# Patient Record
Sex: MALE | Race: WHITE | NOT HISPANIC OR LATINO | Employment: STUDENT | RURAL
[De-identification: names, ages, dates, MRNs, and addresses within clinical notes are randomized per-mention and may not be internally consistent; named-entity substitution may affect disease eponyms.]

---
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
## 2024-04-09 ENCOUNTER — OFFICE VISIT (OUTPATIENT)
Dept: PRIMARY CARE CLINIC | Facility: CLINIC | Age: 19
End: 2024-04-09

## 2024-04-09 DIAGNOSIS — Z02.4 ENCOUNTER FOR COMMERCIAL DRIVER MEDICAL EXAMINATION (CDME): Primary | ICD-10-CM

## 2024-04-09 PROCEDURE — 99499 UNLISTED E&M SERVICE: CPT | Mod: ,,, | Performed by: NURSE PRACTITIONER

## 2024-08-05 ENCOUNTER — OFFICE VISIT (OUTPATIENT)
Dept: FAMILY MEDICINE | Facility: CLINIC | Age: 19
End: 2024-08-05
Payer: COMMERCIAL

## 2024-08-05 VITALS
TEMPERATURE: 97 F | BODY MASS INDEX: 19.39 KG/M2 | HEART RATE: 84 BPM | DIASTOLIC BLOOD PRESSURE: 60 MMHG | OXYGEN SATURATION: 96 % | WEIGHT: 130.88 LBS | HEIGHT: 69 IN | SYSTOLIC BLOOD PRESSURE: 104 MMHG | RESPIRATION RATE: 18 BRPM

## 2024-08-05 DIAGNOSIS — J02.9 SORE THROAT: ICD-10-CM

## 2024-08-05 DIAGNOSIS — R50.9 FEELS FEVERISH: ICD-10-CM

## 2024-08-05 DIAGNOSIS — R09.81 NASAL CONGESTION: Primary | ICD-10-CM

## 2024-08-05 LAB
CTP QC/QA: YES
CTP QC/QA: YES
MOLECULAR STREP A: NEGATIVE
SARS-COV-2 RDRP RESP QL NAA+PROBE: NEGATIVE

## 2024-08-05 PROCEDURE — 1160F RVW MEDS BY RX/DR IN RCRD: CPT | Mod: CPTII,,, | Performed by: NURSE PRACTITIONER

## 2024-08-05 PROCEDURE — 3078F DIAST BP <80 MM HG: CPT | Mod: CPTII,,, | Performed by: NURSE PRACTITIONER

## 2024-08-05 PROCEDURE — 3074F SYST BP LT 130 MM HG: CPT | Mod: CPTII,,, | Performed by: NURSE PRACTITIONER

## 2024-08-05 PROCEDURE — 99213 OFFICE O/P EST LOW 20 MIN: CPT | Mod: ,,, | Performed by: NURSE PRACTITIONER

## 2024-08-05 PROCEDURE — 87651 STREP A DNA AMP PROBE: CPT | Mod: QW,,, | Performed by: NURSE PRACTITIONER

## 2024-08-05 PROCEDURE — 1159F MED LIST DOCD IN RCRD: CPT | Mod: CPTII,,, | Performed by: NURSE PRACTITIONER

## 2024-08-05 PROCEDURE — 87635 SARS-COV-2 COVID-19 AMP PRB: CPT | Mod: QW,,, | Performed by: NURSE PRACTITIONER

## 2024-08-05 PROCEDURE — 3008F BODY MASS INDEX DOCD: CPT | Mod: CPTII,,, | Performed by: NURSE PRACTITIONER

## 2024-08-05 RX ORDER — CETIRIZINE HYDROCHLORIDE, PSEUDOEPHEDRINE HYDROCHLORIDE 5; 120 MG/1; MG/1
1 TABLET, FILM COATED, EXTENDED RELEASE ORAL EVERY 12 HOURS PRN
Qty: 24 TABLET | Refills: 1 | Status: SHIPPED | OUTPATIENT
Start: 2024-08-05 | End: 2024-08-15

## 2024-08-09 ENCOUNTER — CLINICAL SUPPORT (OUTPATIENT)
Dept: PRIMARY CARE CLINIC | Facility: CLINIC | Age: 19
End: 2024-08-09

## 2024-08-09 VITALS
OXYGEN SATURATION: 97 % | TEMPERATURE: 98 F | HEART RATE: 66 BPM | SYSTOLIC BLOOD PRESSURE: 123 MMHG | RESPIRATION RATE: 20 BRPM | BODY MASS INDEX: 19.26 KG/M2 | HEIGHT: 69 IN | WEIGHT: 130 LBS | DIASTOLIC BLOOD PRESSURE: 75 MMHG

## 2024-08-09 DIAGNOSIS — H66.91 ACUTE RIGHT OTITIS MEDIA: Primary | ICD-10-CM

## 2024-08-09 RX ORDER — CIPROFLOXACIN AND DEXAMETHASONE 3; 1 MG/ML; MG/ML
4 SUSPENSION/ DROPS AURICULAR (OTIC) 2 TIMES DAILY
Qty: 7.5 ML | Refills: 0 | Status: SHIPPED | OUTPATIENT
Start: 2024-08-09

## 2024-08-09 RX ORDER — IBUPROFEN 800 MG/1
800 TABLET ORAL EVERY 8 HOURS PRN
Qty: 30 TABLET | Refills: 0 | Status: SHIPPED | OUTPATIENT
Start: 2024-08-09

## 2024-08-09 RX ORDER — METHYLPREDNISOLONE ACETATE 80 MG/ML
80 INJECTION, SUSPENSION INTRA-ARTICULAR; INTRALESIONAL; INTRAMUSCULAR; SOFT TISSUE
Status: COMPLETED | OUTPATIENT
Start: 2024-08-09 | End: 2024-08-09

## 2024-08-09 RX ORDER — AMOXICILLIN AND CLAVULANATE POTASSIUM 875; 125 MG/1; MG/1
1 TABLET, FILM COATED ORAL 2 TIMES DAILY
Qty: 14 TABLET | Refills: 0 | Status: SHIPPED | OUTPATIENT
Start: 2024-08-09

## 2024-08-09 RX ORDER — DEXAMETHASONE SODIUM PHOSPHATE 4 MG/ML
4 INJECTION, SOLUTION INTRA-ARTICULAR; INTRALESIONAL; INTRAMUSCULAR; INTRAVENOUS; SOFT TISSUE
Status: COMPLETED | OUTPATIENT
Start: 2024-08-09 | End: 2024-08-09

## 2024-08-09 RX ADMIN — DEXAMETHASONE SODIUM PHOSPHATE 4 MG: 4 INJECTION, SOLUTION INTRA-ARTICULAR; INTRALESIONAL; INTRAMUSCULAR; INTRAVENOUS; SOFT TISSUE at 09:08

## 2024-08-09 RX ADMIN — METHYLPREDNISOLONE ACETATE 400 MG: 80 INJECTION, SUSPENSION INTRA-ARTICULAR; INTRALESIONAL; INTRAMUSCULAR; SOFT TISSUE at 09:08

## 2025-02-07 ENCOUNTER — OFFICE VISIT (OUTPATIENT)
Dept: SURGERY | Facility: CLINIC | Age: 20
End: 2025-02-07
Attending: SURGERY
Payer: COMMERCIAL

## 2025-02-07 DIAGNOSIS — R22.42 SKIN LUMP OF LEG, LEFT: Primary | ICD-10-CM

## 2025-02-07 PROCEDURE — 99999 PR PBB SHADOW E&M-EST. PATIENT-LVL III: CPT | Mod: PBBFAC,,, | Performed by: SURGERY

## 2025-02-07 PROCEDURE — 99202 OFFICE O/P NEW SF 15 MIN: CPT | Mod: S$PBB,,, | Performed by: SURGERY

## 2025-02-07 PROCEDURE — 99213 OFFICE O/P EST LOW 20 MIN: CPT | Mod: PBBFAC | Performed by: SURGERY

## 2025-02-07 RX ORDER — SULFAMETHOXAZOLE AND TRIMETHOPRIM 800; 160 MG/1; MG/1
1 TABLET ORAL 2 TIMES DAILY
Qty: 20 TABLET | Refills: 0 | Status: SHIPPED | OUTPATIENT
Start: 2025-02-07 | End: 2025-02-10

## 2025-02-07 NOTE — PROGRESS NOTES
General Surgery History and Physical      Patient ID: Hector Gonzalez is a 19 y.o. male.    Chief Complaint: Cyst (Cyst/ lump behind left knee)      HPI:  19-year-old male who a few weeks ago noticed a little knot behind his left knee.  Says it fluctuates in size as well as pain.  He will be active and then it kind of it increases in size gets little red and tender about the size of a golf ball and then shrink back down after awhile.  He has been doing this intermittently.  It is swollen yesterday after he was climbing up and down a ladder.  No fever no chills no drainage never had this before.  He does not take antibiotics in the past but it does resolve on its own.    Review of Systems   Constitutional:  Negative for activity change, appetite change, fatigue and fever.   HENT:  Negative for trouble swallowing.    Respiratory:  Negative for cough and shortness of breath.    Cardiovascular:  Negative for chest pain and palpitations.   Gastrointestinal:  Negative for abdominal distention, abdominal pain, blood in stool, constipation and diarrhea.   Genitourinary:  Negative for flank pain.   Musculoskeletal:  Negative for neck pain and neck stiffness.   Skin:  Positive for color change.   Neurological:  Negative for weakness.       Current Outpatient Medications   Medication Sig Dispense Refill    ibuprofen (ADVIL,MOTRIN) 800 MG tablet Take 1 tablet (800 mg total) by mouth every 8 (eight) hours as needed for Pain. 30 tablet 0    amoxicillin-clavulanate 875-125mg (AUGMENTIN) 875-125 mg per tablet Take 1 tablet by mouth 2 (two) times daily. (Patient not taking: Reported on 2/7/2025) 14 tablet 0    ciprofloxacin-dexAMETHasone 0.3-0.1% (CIPRODEX) 0.3-0.1 % DrpS Place 4 drops into both ears 2 (two) times daily. (Patient not taking: Reported on 2/7/2025) 7.5 mL 0     No current facility-administered medications for this visit.        Review of patient's allergies indicates:  No Known Allergies    History reviewed. No pertinent past medical history.    History reviewed. No pertinent surgical history.    No family history on file.    Social History     Socioeconomic History    Marital status: Single   Tobacco Use    Smoking status: Never     Passive exposure: Never    Smokeless tobacco: Never   Substance and Sexual Activity    Alcohol use: Never    Drug use: Never    Sexual activity: Yes       There were no vitals filed for this visit.    Physical Exam  Constitutional:       General: He is not in acute distress.  HENT:      Head: Normocephalic.   Cardiovascular:      Rate and Rhythm: Normal rate and regular rhythm.      Pulses: Normal pulses.   Pulmonary:      Effort: Pulmonary effort is normal. No respiratory distress.      Breath sounds: Normal breath sounds.   Abdominal:      General: Abdomen is flat. There is no distension.      Palpations: Abdomen is soft.      Tenderness: There is no abdominal tenderness.   Musculoskeletal:         General: Normal range of motion.   Skin:     General: Skin is warm.      Findings: Lesion (Behind the patient's left knee has an proximally golf ball sized tender mildly red nodule.) present.   Neurological:      General: No focal deficit present.      Mental Status: He is oriented to person, place, and time.         Assessment & Plan:    Skin lump of leg, left  -     US Extremity Non Vascular Limited Left; Future; Expected date: 02/07/2025        Will ultrasound the area to make sure it is not a Baker's cyst that is fluctuating in size.  Concern also for a superinfected abscess over a skin cyst.  Will give some antibiotics and order an ultrasound to evaluate it.  He will come back afterwards and will talk about the results.  All questions answered.

## 2025-02-10 ENCOUNTER — OFFICE VISIT (OUTPATIENT)
Dept: ORTHOPEDICS | Facility: CLINIC | Age: 20
End: 2025-02-10
Payer: COMMERCIAL

## 2025-02-10 ENCOUNTER — HOSPITAL ENCOUNTER (OUTPATIENT)
Dept: RADIOLOGY | Facility: HOSPITAL | Age: 20
Discharge: HOME | End: 2025-02-10
Attending: NURSE PRACTITIONER
Payer: COMMERCIAL

## 2025-02-10 VITALS
OXYGEN SATURATION: 99 % | BODY MASS INDEX: 20.75 KG/M2 | HEART RATE: 54 BPM | DIASTOLIC BLOOD PRESSURE: 54 MMHG | SYSTOLIC BLOOD PRESSURE: 124 MMHG | WEIGHT: 140.13 LBS | HEIGHT: 69 IN

## 2025-02-10 DIAGNOSIS — L02.91 CUTANEOUS ABSCESS, UNSPECIFIED SITE: ICD-10-CM

## 2025-02-10 DIAGNOSIS — M25.562 LEFT KNEE PAIN, UNSPECIFIED CHRONICITY: ICD-10-CM

## 2025-02-10 DIAGNOSIS — M25.562 LEFT KNEE PAIN, UNSPECIFIED CHRONICITY: Primary | ICD-10-CM

## 2025-02-10 PROCEDURE — 99204 OFFICE O/P NEW MOD 45 MIN: CPT | Mod: S$PBB,,, | Performed by: NURSE PRACTITIONER

## 2025-02-10 PROCEDURE — 99213 OFFICE O/P EST LOW 20 MIN: CPT | Mod: PBBFAC,25 | Performed by: NURSE PRACTITIONER

## 2025-02-10 PROCEDURE — 1159F MED LIST DOCD IN RCRD: CPT | Mod: CPTII,,, | Performed by: NURSE PRACTITIONER

## 2025-02-10 PROCEDURE — 3074F SYST BP LT 130 MM HG: CPT | Mod: CPTII,,, | Performed by: NURSE PRACTITIONER

## 2025-02-10 PROCEDURE — 73560 X-RAY EXAM OF KNEE 1 OR 2: CPT | Mod: TC,LT

## 2025-02-10 PROCEDURE — 99999 PR PBB SHADOW E&M-EST. PATIENT-LVL III: CPT | Mod: PBBFAC,,, | Performed by: NURSE PRACTITIONER

## 2025-02-10 PROCEDURE — 3008F BODY MASS INDEX DOCD: CPT | Mod: CPTII,,, | Performed by: NURSE PRACTITIONER

## 2025-02-10 PROCEDURE — 3078F DIAST BP <80 MM HG: CPT | Mod: CPTII,,, | Performed by: NURSE PRACTITIONER

## 2025-02-10 RX ORDER — SULFAMETHOXAZOLE AND TRIMETHOPRIM 800; 160 MG/1; MG/1
1 TABLET ORAL 2 TIMES DAILY
Qty: 20 TABLET | Refills: 0 | Status: SHIPPED | OUTPATIENT
Start: 2025-02-10

## 2025-02-10 NOTE — PROGRESS NOTES
ASSESSMENT:      ICD-10-CM ICD-9-CM   1. Left knee pain, unspecified chronicity  M25.562 719.46   2. Cutaneous abscess, unspecified site  L02.91 682.9       PLAN:     Findings and treatment options were discussed with the patient regarding the diagnosis.   All questions were answered regarding Hector Gonzalez 's painful knee.      Natural history and expected course discussed. Questions answered. Educational materials distributed. Reduction in offending activity. OTC analgesics as needed.  We will schedule follow up with Dr. Premier rosado in 1 week.  Patient reports he works out of town and will be back on Friday.  We will have him return to clinic with Dr. Premier rosado on Monday.  Bactrim ds 1 p.o. b.i.d..  We did drain the abscess in the back of his knee today.  Received 3 cc bloody fluid  There are no Patient Instructions on file for this visit.    IMAGING:   No results found.   EXAMINATION:  XR KNEE, 2 VIEWS, LEFT     CLINICAL HISTORY:  Left knee pain.     TECHNIQUE:  AP, lateral views of left knee obtained.     COMPARISON:  No past imaging of left knee at this time.     Impression:     1.  No significant osseous abnormality with no fracture, dislocation, osseous destruction or joint narrowing.     2.  No significant soft tissue swelling identified.     3.  Significant clothing artifact.        Electronically signed by:Hector Collins  Date:                                            02/10/2025  Time:                                           08:28        Exam Ended: 02/10/25 08:14 CST Last Resulted: 02/10/25 08:28 CST       CC: Knee pain    19 y.o. Male who presents as a new patient to me for evaluation of  left knee pain.    Occupation:   Pain has been present for the past 3-4 months off an on.  He states that his knee will swell up and then go back down.  The swelling is in the back of the knee.  Golf ball size red swollen area that appears to be fluid-filled.  Reports it has been going on for quite some  time but it does go back down on its own.  This time it has been present for a week.  Patient reports it is very tender.  Swelling and pain posterior.   Injury description No specific injury.   Mechanical symptoms, such as clicking, locking, and popping are not present.    Swelling and effusions  are present.   The patient does not  describe symptoms of knee instability, such as the knee buckling and the knee giving way.   Symptoms are worsened with activity.  Better with rest. Treatment thus far has included rest, activity modifications, and oral medications.    he  has not had formal physical therapy.  he has not had prior injections injections into the knee.   he has not had previous advanced imaging such as MRI.     Here today to discuss diagnosis and treatment options.          REVIEW OF SYSTEMS:   Constitution: Negative. Negative for chills, fever and night sweats.    Hematologic/Lymphatic: Negative for bleeding problem. Does not bruise/bleed easily.   Skin: Negative for dry skin, itching and rash.   Musculoskeletal: Negative for falls. Positive for knee pain and muscle weakness.     All other review of symptoms were reviewed and found to be noncontributory.     PAST MEDICAL HISTORY:   History reviewed. No pertinent past medical history.    PAST SURGICAL HISTORY:   History reviewed. No pertinent surgical history.    FAMILY HISTORY:   No family history on file.    SOCIAL HISTORY:   Social History     Socioeconomic History    Marital status: Single   Tobacco Use    Smoking status: Never     Passive exposure: Never    Smokeless tobacco: Never   Substance and Sexual Activity    Alcohol use: Never    Drug use: Never    Sexual activity: Yes       MEDICATIONS:     Current Outpatient Medications:     ibuprofen (ADVIL,MOTRIN) 800 MG tablet, Take 1 tablet (800 mg total) by mouth every 8 (eight) hours as needed for Pain., Disp: 30 tablet, Rfl: 0    amoxicillin-clavulanate 875-125mg (AUGMENTIN) 875-125 mg per  tablet, Take 1 tablet by mouth 2 (two) times daily. (Patient not taking: Reported on 2/7/2025), Disp: 14 tablet, Rfl: 0    ciprofloxacin-dexAMETHasone 0.3-0.1% (CIPRODEX) 0.3-0.1 % DrpS, Place 4 drops into both ears 2 (two) times daily. (Patient not taking: Reported on 2/7/2025), Disp: 7.5 mL, Rfl: 0    sulfamethoxazole-trimethoprim 800-160mg (BACTRIM DS) 800-160 mg Tab, Take 1 tablet by mouth 2 (two) times daily., Disp: 20 tablet, Rfl: 0    ALLERGIES:   Review of patient's allergies indicates:  No Known Allergies     PHYSICAL EXAMINATION:    Ortho/SPM Exam  Abscess to the back of the left knee golf ball size.  Red warm and swollen.  No redness or swelling to the knee or knee joint.  Denies any knee pain.  All the pain is in the back of his knee.    Orders Placed This Encounter   Procedures    X-Ray Knee 1 or 2 View Left     Standing Status:   Future     Number of Occurrences:   1     Standing Expiration Date:   2/10/2026     Order Specific Question:   May the Radiologist modify the order per protocol to meet the clinical needs of the patient?     Answer:   Yes     Order Specific Question:   Release to patient     Answer:   Immediate       Large Joint Aspiration/Injection    Date/Time: 2/10/2025 8:00 AM    Performed by: Rosanna Beebe FNP  Authorized by: Rosanna Beebe FNP    Consent Done?:  Yes (Verbal)  Indications:  Pain  Prep: patient was prepped and draped in usual sterile fashion    Local anesthetic:  Bupivacaine 0.25% without epinephrine    Details:  Needle Size:  18 G  Approach:  Posterior  Aspirate amount (mL):  3  Aspirate:  Bloody

## 2025-02-17 ENCOUNTER — OFFICE VISIT (OUTPATIENT)
Dept: ORTHOPEDICS | Facility: CLINIC | Age: 20
End: 2025-02-17
Payer: COMMERCIAL

## 2025-02-17 DIAGNOSIS — L02.416 ABSCESS OF KNEE, LEFT: Primary | ICD-10-CM

## 2025-02-17 DIAGNOSIS — L02.91 CUTANEOUS ABSCESS, UNSPECIFIED SITE: Primary | ICD-10-CM

## 2025-02-17 PROCEDURE — 99213 OFFICE O/P EST LOW 20 MIN: CPT | Mod: PBBFAC | Performed by: ORTHOPAEDIC SURGERY

## 2025-02-17 NOTE — PROGRESS NOTES
CLINIC NOTE       Chief Complaint   Patient presents with    Left Knee - Follow-up        Hector Gonzalez is a 19 y.o. male seen today for evaluation of his left knee.  Developed painful swelling in the popliteal space region that has became fluctuant and erythematous and he is associated with the pain.  He was initially seen by Dr. Vicente Bowman (general surgery) who recommended orthopedic evaluation.  He was then seen by Rosanna torres FNCECIL.  The abscess was aspirated and cultured.  Cultures have grown staph epidermidis this with multiple sensitivities.  He is on Bactrim but the fluctuant areas not resolved.    X-rays left knee 02/10/2025 were reviewed AP and lateral projection.  Bones well mineralized.  Joint spaces are well maintained with no evidence of fracture, dislocation or pathologic bone.  History reviewed. No pertinent past medical history.  No family history on file.  Medications Ordered Prior to Encounter[1]    ROS     There were no vitals filed for this visit.    History reviewed. No pertinent surgical history.     Review of patient's allergies indicates:  No Known Allergies     Ortho Exam :  Well-developed well-nourished  male no acute distress.  Alert oriented cooperative.  Neck is supple without JVD.  Breathing is regular nonlabored.  Skin is warm and dry no lesions seen.  Left knee show 2.5 cm ovoid erythematous area in the popliteal space is region.  There is tenderness palpation in increased heat.  There is no swelling of the knee.    Radiographic Examination:    Technique:    Findings:    Impression:   See Above    Assessment and Plan  There are no active problems to display for this patient.   Impression: Subcutaneous abscess-left knee popliteal space   Plan:  The patient is offered I and D left knee abscess as an outpatient.  Potential benefits risks surgery outlined to include but not limited to bleeding, infection, damage to blood vessels and nerves, need for further surgery, other  risks and complications including even death the patient wished to proceed.      Roberto Escobar M.D.                   [1]   Current Outpatient Medications on File Prior to Visit   Medication Sig Dispense Refill    amoxicillin-clavulanate 875-125mg (AUGMENTIN) 875-125 mg per tablet Take 1 tablet by mouth 2 (two) times daily. (Patient not taking: Reported on 2/7/2025) 14 tablet 0    ciprofloxacin-dexAMETHasone 0.3-0.1% (CIPRODEX) 0.3-0.1 % DrpS Place 4 drops into both ears 2 (two) times daily. (Patient not taking: Reported on 2/7/2025) 7.5 mL 0    ibuprofen (ADVIL,MOTRIN) 800 MG tablet Take 1 tablet (800 mg total) by mouth every 8 (eight) hours as needed for Pain. 30 tablet 0    sulfamethoxazole-trimethoprim 800-160mg (BACTRIM DS) 800-160 mg Tab Take 1 tablet by mouth 2 (two) times daily. 20 tablet 0     No current facility-administered medications on file prior to visit.

## 2025-02-20 NOTE — H&P
[facility] - Orthopedic Periop Services  Orthopedics  H&P    Patient Name: Hector Gonzalez  MRN: [id number]  Admission Date: (Not on file)  Primary Care Provider: Therese Myles NP    Patient information was obtained from patient and ER records.     Subjective:     Principal Problem:Abscess of knee, left    Chief Complaint: No chief complaint on file.       HPI: Chief complaint:  Painful abscess left knee  History:   Hector Gonzalez is a 19 y.o. male seen  for evaluation of his left knee.  Developed painful swelling in the popliteal space region that has became fluctuant and erythematous and he is associated with the pain.  He was initially seen by Dr. Vicente Bowman (general surgery) who recommended orthopedic evaluation.  He was then seen by Rosanna JO.  The abscess was aspirated and cultured.  Cultures have grown staph epidermidis this with multiple sensitivities.  He is on Bactrim but the fluctuant areas not resolved.    X-rays left knee 02/10/2025 were reviewed AP and lateral projection.  Bones well mineralized.  Joint spaces are well maintained with no evidence of fracture, dislocation or pathologic bone.        History reviewed. No pertinent past medical history.    History reviewed. No pertinent surgical history.    Review of patient's allergies indicates:  No Known Allergies    No current facility-administered medications for this encounter.     Current Outpatient Medications   Medication Sig    amoxicillin-clavulanate 875-125mg (AUGMENTIN) 875-125 mg per tablet Take 1 tablet by mouth 2 (two) times daily. (Patient not taking: Reported on 2/7/2025)    ciprofloxacin-dexAMETHasone 0.3-0.1% (CIPRODEX) 0.3-0.1 % DrpS Place 4 drops into both ears 2 (two) times daily. (Patient not taking: Reported on 2/7/2025)    ibuprofen (ADVIL,MOTRIN) 800 MG tablet Take 1 tablet (800 mg total) by mouth every 8 (eight) hours as needed for Pain.    sulfamethoxazole-trimethoprim 800-160mg (BACTRIM DS) 800-160 mg Tab Take 1  tablet by mouth 2 (two) times daily.     Family History    None       Tobacco Use    Smoking status: Never     Passive exposure: Never    Smokeless tobacco: Never   Substance and Sexual Activity    Alcohol use: Never    Drug use: Never    Sexual activity: Yes     Review of Systems   Constitutional: Negative.     Objective:     Vital Signs (Most Recent):    Vital Signs (24h Range):  BP: ()/()   Arterial Line BP: ()/()            There is no height or weight on file to calculate BMI.    No intake or output data in the 24 hours ending 02/20/25 1704     General    Vitals reviewed.  Constitutional: He is oriented to person, place, and time. He appears well-developed and well-nourished.   HENT:   Head: Normocephalic and atraumatic.   Eyes: EOM are normal. Pupils are equal, round, and reactive to light.   Neck: Neck supple.   Cardiovascular:  Normal rate, regular rhythm and normal heart sounds.            Pulmonary/Chest: Effort normal and breath sounds normal.   Abdominal: Soft. Bowel sounds are normal.   Neurological: He is alert and oriented to person, place, and time.   Psychiatric: He has a normal mood and affect. His behavior is normal.           Right Knee Exam   Right knee exam is normal.    Left Knee Exam     Inspection   Erythema: present  Swelling: present    Range of Motion   Extension:  normal   Flexion:  normal     Tests   Stability   Lachman: normal (-1 to 2mm)   PCL-Posterior Drawer: normal (0 to 2mm)  MCL - Valgus: normal (0 to 2mm)  LCL - Varus: normal (0 to 2mm)  Pivot Shift: normal (Equal)    Other   Sensation: normal    Vascular Exam       Left Pulses  Dorsalis Pedis:      2+  Posterior Tibial:      2+           Significant Labs: All pertinent labs within the past 24 hours have been reviewed.    Significant Imaging: I have reviewed all pertinent imaging results/findings.  Assessment/Plan:     No notes have been filed under this hospital service.  Service: Orthopedic Surgery      Roberto Escobar  MD  Orthopedics  [facility] - Orthopedic Periop Services

## 2025-02-20 NOTE — HPI
Chief complaint:  Painful abscess left knee  History:   Hector Gonzalez is a 19 y.o. male seen  for evaluation of his left knee.  Developed painful swelling in the popliteal space region that has became fluctuant and erythematous and he is associated with the pain.  He was initially seen by Dr. Vicente Bowman (general surgery) who recommended orthopedic evaluation.  He was then seen by Rosanna JO.  The abscess was aspirated and cultured.  Cultures have grown staph epidermidis this with multiple sensitivities.  He is on Bactrim but the fluctuant areas not resolved.    X-rays left knee 02/10/2025 were reviewed AP and lateral projection.  Bones well mineralized.  Joint spaces are well maintained with no evidence of fracture, dislocation or pathologic bone.

## 2025-02-20 NOTE — SUBJECTIVE & OBJECTIVE
History reviewed. No pertinent past medical history.    History reviewed. No pertinent surgical history.    Review of patient's allergies indicates:  No Known Allergies    No current facility-administered medications for this encounter.     Current Outpatient Medications   Medication Sig    amoxicillin-clavulanate 875-125mg (AUGMENTIN) 875-125 mg per tablet Take 1 tablet by mouth 2 (two) times daily. (Patient not taking: Reported on 2/7/2025)    ciprofloxacin-dexAMETHasone 0.3-0.1% (CIPRODEX) 0.3-0.1 % DrpS Place 4 drops into both ears 2 (two) times daily. (Patient not taking: Reported on 2/7/2025)    ibuprofen (ADVIL,MOTRIN) 800 MG tablet Take 1 tablet (800 mg total) by mouth every 8 (eight) hours as needed for Pain.    sulfamethoxazole-trimethoprim 800-160mg (BACTRIM DS) 800-160 mg Tab Take 1 tablet by mouth 2 (two) times daily.     Family History    None       Tobacco Use    Smoking status: Never     Passive exposure: Never    Smokeless tobacco: Never   Substance and Sexual Activity    Alcohol use: Never    Drug use: Never    Sexual activity: Yes     Review of Systems   Constitutional: Negative.     Objective:     Vital Signs (Most Recent):    Vital Signs (24h Range):  BP: ()/()   Arterial Line BP: ()/()            There is no height or weight on file to calculate BMI.    No intake or output data in the 24 hours ending 02/20/25 1704     General    Vitals reviewed.  Constitutional: He is oriented to person, place, and time. He appears well-developed and well-nourished.   HENT:   Head: Normocephalic and atraumatic.   Eyes: EOM are normal. Pupils are equal, round, and reactive to light.   Neck: Neck supple.   Cardiovascular:  Normal rate, regular rhythm and normal heart sounds.            Pulmonary/Chest: Effort normal and breath sounds normal.   Abdominal: Soft. Bowel sounds are normal.   Neurological: He is alert and oriented to person, place, and time.   Psychiatric: He has a normal mood and affect. His behavior  is normal.           Right Knee Exam   Right knee exam is normal.    Left Knee Exam     Inspection   Erythema: present  Swelling: present    Range of Motion   Extension:  normal   Flexion:  normal     Tests   Stability   Lachman: normal (-1 to 2mm)   PCL-Posterior Drawer: normal (0 to 2mm)  MCL - Valgus: normal (0 to 2mm)  LCL - Varus: normal (0 to 2mm)  Pivot Shift: normal (Equal)    Other   Sensation: normal    Vascular Exam       Left Pulses  Dorsalis Pedis:      2+  Posterior Tibial:      2+           Significant Labs: All pertinent labs within the past 24 hours have been reviewed.    Significant Imaging: I have reviewed all pertinent imaging results/findings.

## 2025-02-21 ENCOUNTER — ANESTHESIA (OUTPATIENT)
Dept: SURGERY | Facility: HOSPITAL | Age: 20
End: 2025-02-21
Payer: COMMERCIAL

## 2025-02-21 ENCOUNTER — ANESTHESIA EVENT (OUTPATIENT)
Dept: SURGERY | Facility: HOSPITAL | Age: 20
End: 2025-02-21
Payer: COMMERCIAL

## 2025-02-21 ENCOUNTER — HOSPITAL ENCOUNTER (OUTPATIENT)
Facility: HOSPITAL | Age: 20
Discharge: HOME | End: 2025-02-21
Attending: ORTHOPAEDIC SURGERY | Admitting: ORTHOPAEDIC SURGERY
Payer: COMMERCIAL

## 2025-02-21 VITALS
HEART RATE: 48 BPM | OXYGEN SATURATION: 98 % | DIASTOLIC BLOOD PRESSURE: 41 MMHG | TEMPERATURE: 98 F | SYSTOLIC BLOOD PRESSURE: 102 MMHG | HEIGHT: 69 IN | BODY MASS INDEX: 20.73 KG/M2 | RESPIRATION RATE: 16 BRPM | WEIGHT: 140 LBS

## 2025-02-21 DIAGNOSIS — L02.91 CUTANEOUS ABSCESS, UNSPECIFIED SITE: ICD-10-CM

## 2025-02-21 DIAGNOSIS — L02.416 ABSCESS OF KNEE, LEFT: Primary | ICD-10-CM

## 2025-02-21 PROCEDURE — 63600175 PHARM REV CODE 636 W HCPCS: Performed by: NURSE ANESTHETIST, CERTIFIED REGISTERED

## 2025-02-21 PROCEDURE — 88304 TISSUE EXAM BY PATHOLOGIST: CPT | Mod: 26,,, | Performed by: PATHOLOGY

## 2025-02-21 PROCEDURE — 27000165 HC TUBE, ETT CUFFED: Performed by: ANESTHESIOLOGY

## 2025-02-21 PROCEDURE — 27000716 HC OXISENSOR PROBE, ANY SIZE: Performed by: ANESTHESIOLOGY

## 2025-02-21 PROCEDURE — 87070 CULTURE OTHR SPECIMN AEROBIC: CPT | Performed by: ORTHOPAEDIC SURGERY

## 2025-02-21 PROCEDURE — 71000033 HC RECOVERY, INTIAL HOUR: Performed by: ORTHOPAEDIC SURGERY

## 2025-02-21 PROCEDURE — 36000705 HC OR TIME LEV I EA ADD 15 MIN: Performed by: ORTHOPAEDIC SURGERY

## 2025-02-21 PROCEDURE — 27000655: Performed by: ANESTHESIOLOGY

## 2025-02-21 PROCEDURE — 25000003 PHARM REV CODE 250: Performed by: ORTHOPAEDIC SURGERY

## 2025-02-21 PROCEDURE — 27301 DRAIN THIGH/KNEE LESION: CPT | Mod: LT,,, | Performed by: ORTHOPAEDIC SURGERY

## 2025-02-21 PROCEDURE — 37000009 HC ANESTHESIA EA ADD 15 MINS: Performed by: ORTHOPAEDIC SURGERY

## 2025-02-21 PROCEDURE — D9220A PRA ANESTHESIA: Mod: CRNA,,, | Performed by: NURSE ANESTHETIST, CERTIFIED REGISTERED

## 2025-02-21 PROCEDURE — 25000003 PHARM REV CODE 250: Performed by: NURSE ANESTHETIST, CERTIFIED REGISTERED

## 2025-02-21 PROCEDURE — 97161 PT EVAL LOW COMPLEX 20 MIN: CPT

## 2025-02-21 PROCEDURE — 87075 CULTR BACTERIA EXCEPT BLOOD: CPT | Performed by: ORTHOPAEDIC SURGERY

## 2025-02-21 PROCEDURE — 88304 TISSUE EXAM BY PATHOLOGIST: CPT | Mod: TC,SUR | Performed by: ORTHOPAEDIC SURGERY

## 2025-02-21 PROCEDURE — 37000008 HC ANESTHESIA 1ST 15 MINUTES: Performed by: ORTHOPAEDIC SURGERY

## 2025-02-21 PROCEDURE — D9220A PRA ANESTHESIA: Mod: ANES,,, | Performed by: ANESTHESIOLOGY

## 2025-02-21 PROCEDURE — 63600175 PHARM REV CODE 636 W HCPCS: Performed by: ANESTHESIOLOGY

## 2025-02-21 PROCEDURE — 36000704 HC OR TIME LEV I 1ST 15 MIN: Performed by: ORTHOPAEDIC SURGERY

## 2025-02-21 PROCEDURE — 71000015 HC POSTOP RECOV 1ST HR: Performed by: ORTHOPAEDIC SURGERY

## 2025-02-21 RX ORDER — ONDANSETRON HYDROCHLORIDE 2 MG/ML
4 INJECTION, SOLUTION INTRAVENOUS DAILY PRN
Status: DISCONTINUED | OUTPATIENT
Start: 2025-02-21 | End: 2025-02-21 | Stop reason: HOSPADM

## 2025-02-21 RX ORDER — HYDROCODONE BITARTRATE AND ACETAMINOPHEN 10; 325 MG/1; MG/1
1 TABLET ORAL EVERY 4 HOURS PRN
Status: DISCONTINUED | OUTPATIENT
Start: 2025-02-21 | End: 2025-02-21 | Stop reason: HOSPADM

## 2025-02-21 RX ORDER — ACETAMINOPHEN 500 MG
1000 TABLET ORAL EVERY 6 HOURS PRN
Status: DISCONTINUED | OUTPATIENT
Start: 2025-02-21 | End: 2025-02-21 | Stop reason: HOSPADM

## 2025-02-21 RX ORDER — MORPHINE SULFATE 10 MG/ML
4 INJECTION INTRAMUSCULAR; INTRAVENOUS; SUBCUTANEOUS EVERY 5 MIN PRN
Status: DISCONTINUED | OUTPATIENT
Start: 2025-02-21 | End: 2025-02-21 | Stop reason: HOSPADM

## 2025-02-21 RX ORDER — HYDROCODONE BITARTRATE AND ACETAMINOPHEN 5; 325 MG/1; MG/1
1 TABLET ORAL EVERY 6 HOURS PRN
Qty: 28 TABLET | Refills: 0 | Status: SHIPPED | OUTPATIENT
Start: 2025-02-21 | End: 2025-02-28

## 2025-02-21 RX ORDER — LIDOCAINE HYDROCHLORIDE 20 MG/ML
INJECTION, SOLUTION EPIDURAL; INFILTRATION; INTRACAUDAL; PERINEURAL
Status: DISCONTINUED | OUTPATIENT
Start: 2025-02-21 | End: 2025-02-21

## 2025-02-21 RX ORDER — PROMETHAZINE HYDROCHLORIDE 25 MG/1
25 TABLET ORAL EVERY 6 HOURS PRN
Status: DISCONTINUED | OUTPATIENT
Start: 2025-02-21 | End: 2025-02-21 | Stop reason: HOSPADM

## 2025-02-21 RX ORDER — ROCURONIUM BROMIDE 10 MG/ML
INJECTION, SOLUTION INTRAVENOUS
Status: DISCONTINUED | OUTPATIENT
Start: 2025-02-21 | End: 2025-02-21

## 2025-02-21 RX ORDER — HYDROCODONE BITARTRATE AND ACETAMINOPHEN 5; 325 MG/1; MG/1
1 TABLET ORAL EVERY 4 HOURS PRN
Status: DISCONTINUED | OUTPATIENT
Start: 2025-02-21 | End: 2025-02-21 | Stop reason: HOSPADM

## 2025-02-21 RX ORDER — HYDROMORPHONE HYDROCHLORIDE 2 MG/ML
0.5 INJECTION, SOLUTION INTRAMUSCULAR; INTRAVENOUS; SUBCUTANEOUS EVERY 5 MIN PRN
Status: DISCONTINUED | OUTPATIENT
Start: 2025-02-21 | End: 2025-02-21 | Stop reason: HOSPADM

## 2025-02-21 RX ORDER — SODIUM CHLORIDE 9 MG/ML
INJECTION, SOLUTION INTRAVENOUS CONTINUOUS
Status: DISCONTINUED | OUTPATIENT
Start: 2025-02-21 | End: 2025-02-21 | Stop reason: HOSPADM

## 2025-02-21 RX ORDER — MEPERIDINE HYDROCHLORIDE 25 MG/ML
25 INJECTION INTRAMUSCULAR; INTRAVENOUS; SUBCUTANEOUS EVERY 10 MIN PRN
Status: DISCONTINUED | OUTPATIENT
Start: 2025-02-21 | End: 2025-02-21 | Stop reason: HOSPADM

## 2025-02-21 RX ORDER — GLUCAGON 1 MG
1 KIT INJECTION
Status: DISCONTINUED | OUTPATIENT
Start: 2025-02-21 | End: 2025-02-21 | Stop reason: HOSPADM

## 2025-02-21 RX ORDER — FENTANYL CITRATE 50 UG/ML
INJECTION, SOLUTION INTRAMUSCULAR; INTRAVENOUS
Status: DISCONTINUED | OUTPATIENT
Start: 2025-02-21 | End: 2025-02-21

## 2025-02-21 RX ORDER — ONDANSETRON 4 MG/1
8 TABLET, ORALLY DISINTEGRATING ORAL EVERY 8 HOURS PRN
Status: DISCONTINUED | OUTPATIENT
Start: 2025-02-21 | End: 2025-02-21 | Stop reason: HOSPADM

## 2025-02-21 RX ORDER — PROPOFOL 10 MG/ML
VIAL (ML) INTRAVENOUS
Status: DISCONTINUED | OUTPATIENT
Start: 2025-02-21 | End: 2025-02-21

## 2025-02-21 RX ORDER — ONDANSETRON HYDROCHLORIDE 2 MG/ML
INJECTION, SOLUTION INTRAVENOUS
Status: DISCONTINUED | OUTPATIENT
Start: 2025-02-21 | End: 2025-02-21

## 2025-02-21 RX ORDER — MIDAZOLAM HYDROCHLORIDE 1 MG/ML
INJECTION INTRAMUSCULAR; INTRAVENOUS
Status: DISCONTINUED | OUTPATIENT
Start: 2025-02-21 | End: 2025-02-21

## 2025-02-21 RX ORDER — KETOROLAC TROMETHAMINE 30 MG/ML
INJECTION, SOLUTION INTRAMUSCULAR; INTRAVENOUS
Status: DISCONTINUED | OUTPATIENT
Start: 2025-02-21 | End: 2025-02-21

## 2025-02-21 RX ORDER — DEXAMETHASONE SODIUM PHOSPHATE 4 MG/ML
INJECTION, SOLUTION INTRA-ARTICULAR; INTRALESIONAL; INTRAMUSCULAR; INTRAVENOUS; SOFT TISSUE
Status: DISCONTINUED | OUTPATIENT
Start: 2025-02-21 | End: 2025-02-21

## 2025-02-21 RX ORDER — DIPHENHYDRAMINE HYDROCHLORIDE 50 MG/ML
25 INJECTION INTRAMUSCULAR; INTRAVENOUS EVERY 6 HOURS PRN
Status: DISCONTINUED | OUTPATIENT
Start: 2025-02-21 | End: 2025-02-21 | Stop reason: HOSPADM

## 2025-02-21 RX ORDER — CEFAZOLIN 2 G/1
2 INJECTION, POWDER, FOR SOLUTION INTRAMUSCULAR; INTRAVENOUS
Status: DISCONTINUED | OUTPATIENT
Start: 2025-02-21 | End: 2025-02-21 | Stop reason: HOSPADM

## 2025-02-21 RX ADMIN — LIDOCAINE HYDROCHLORIDE 100 MG: 20 INJECTION, SOLUTION INTRAVENOUS at 08:02

## 2025-02-21 RX ADMIN — ROCURONIUM BROMIDE 50 MG: 10 INJECTION, SOLUTION INTRAVENOUS at 08:02

## 2025-02-21 RX ADMIN — DEXAMETHASONE SODIUM PHOSPHATE 8 MG: 4 INJECTION, SOLUTION INTRA-ARTICULAR; INTRALESIONAL; INTRAMUSCULAR; INTRAVENOUS; SOFT TISSUE at 08:02

## 2025-02-21 RX ADMIN — SODIUM CHLORIDE: 9 INJECTION, SOLUTION INTRAVENOUS at 07:02

## 2025-02-21 RX ADMIN — KETOROLAC TROMETHAMINE 30 MG: 30 INJECTION, SOLUTION INTRAMUSCULAR at 08:02

## 2025-02-21 RX ADMIN — MIDAZOLAM HYDROCHLORIDE 2 MG: 1 INJECTION, SOLUTION INTRAMUSCULAR; INTRAVENOUS at 08:02

## 2025-02-21 RX ADMIN — PROPOFOL 150 MG: 10 INJECTION, EMULSION INTRAVENOUS at 08:02

## 2025-02-21 RX ADMIN — FENTANYL CITRATE 100 MCG: 50 INJECTION, SOLUTION INTRAMUSCULAR; INTRAVENOUS at 08:02

## 2025-02-21 RX ADMIN — SUGAMMADEX 200 MG: 100 INJECTION, SOLUTION INTRAVENOUS at 09:02

## 2025-02-21 RX ADMIN — MEPERIDINE HYDROCHLORIDE 12.5 MG: 25 INJECTION INTRAMUSCULAR; INTRAVENOUS; SUBCUTANEOUS at 09:02

## 2025-02-21 RX ADMIN — DEXTROSE MONOHYDRATE 2 G: 50 INJECTION, SOLUTION INTRAVENOUS at 08:02

## 2025-02-21 RX ADMIN — ONDANSETRON 4 MG: 2 INJECTION INTRAMUSCULAR; INTRAVENOUS at 08:02

## 2025-02-21 NOTE — INTERVAL H&P NOTE
The patient has been examined and the H&P has been reviewed:    I concur with the findings and no changes have occurred since H&P was written.    Surgery risks, benefits and alternative options discussed and understood by patient/family.          Active Hospital Problems    Diagnosis  POA    *Abscess of knee, left [L02.416]  Yes      Resolved Hospital Problems   No resolved problems to display.

## 2025-02-21 NOTE — PT/OT/SLP EVAL
Physical Therapy Evaluation and Discharge Note    Patient Name:  Hector Gonzalez   MRN:  [id number]    Recommendations:     Discharge Recommendations: No Therapy Indicated  Discharge Equipment Recommendations: none   Barriers to discharge: None    Assessment:     Hector Gonzalez is a 19 y.o. male admitted with a medical diagnosis of Abscess of knee, left. Pt had drainage of abscess in LLE, no post op pain, ambulatory without AD.  At this time, patient is functioning at their prior level of function and does not require further acute PT services.     Recent Surgery: Procedure(s) (LRB):  INCISION AND DRAINAGE, KNEE (Left) * Day of Surgery *    Plan:     During this hospitalization, patient does not require further acute PT services.  Please re-consult if situation changes.      Subjective     Chief Complaint: left knee abscess   Patient/Family Comments/goals: Pt is agreeable to PT   Pain/Comfort:  Pain Rating 1: 0/10  Pain Rating Post-Intervention 1: 0/10    Patients cultural, spiritual, Yarsani conflicts given the current situation: no    Living Environment:  Pt lives at home with family  Prior to admission, patients level of function was independent.  Equipment used at home: none.  DME owned (not currently used): none.  Upon discharge, patient will have assistance from family.    Objective:     Communicated with Rn prior to session.  Patient found HOB elevated with peripheral IV upon PT entry to room.    General Precautions: Standard, fall    Orthopedic Precautions:LLE weight bearing as tolerated   Braces: N/A  Respiratory Status: Room air    Exams:  Cognitive Exam:  Patient is oriented to Person, Place, Time, and Situation  Gross Motor Coordination:  WFL    Functional Mobility:  Bed Mobility:     Scooting: independence  Supine to Sit: independence  Transfers:     Sit to Stand:  independence with no AD  Gait: 50 ft independent  Balance: good    AM-PAC 6 CLICK MOBILITY  Total Score:24       Treatment and  Education:  N/a    AM-PAC 6 CLICK MOBILITY  Total Score:24     Patient left ambulatory in room/ortega with all lines intact and call button in reach.    GOALS:   Multidisciplinary Problems       Physical Therapy Goals       Not on file                    DME Justifications:  No DME recommended requiring DME justifications    History:     History reviewed. No pertinent past medical history.    History reviewed. No pertinent surgical history.    Time Tracking:     PT Received On: 02/21/25  PT Start Time: 1055     PT Stop Time: 1103  PT Total Time (min): 8 min     Billable Minutes: Evaluation low complexity      02/21/2025

## 2025-02-21 NOTE — OP NOTE
[facility] - Orthopedic Periop Services  Surgery Department  Operative Note    SUMMARY     Date of Procedure: 2/21/2025     Procedure: Procedure(s) (LRB):  INCISION AND DRAINAGE, KNEE (Left)     Surgeons and Role:     * Roberto Escobar MD - Primary    Assisting Surgeon: None    Pre-Operative Diagnosis: Cutaneous abscess, unspecified site [L02.91]    Post-Operative Diagnosis: Post-Op Diagnosis Codes:     * Cutaneous abscess, unspecified site [L02.91]    Anesthesia:  General    Technical Procedures Used:  The patient is taken the operating room placed supine position.  After adequate level of general anesthesia been achieved (see anesthesia note) the patient is transferred to the right lateral decubitus position and.  The left lower extremity prepped with Betadine draped sterile fashion.  A pneumatic tourniquet placed about the left upper thigh was inflated to pressure of 300 mmHg.  Inspection of the left knee revealed a fluctuant soft tissue swelling involving the popliteal space region.  Skin incision was made in line with the skin creases transversely.  Incision was carried carefully through subcutaneous layers.  Purulent material and a walled-off cavity was identified.  Aerobic and anaerobic cultures were obtained.  Cavity was irrigated antibiotic solution.  The fibers appearing walls of the abscess cavity were excised and submitted for pathologic evaluation.  No foreign bodies were identified.  Wound was again irrigated with antibiotic solution.  Tourniquet was let down.  Hemostasis was achieved with the Bovie electrocautery.  Skin margins were approximated with 4-0 nylon suture using a 0 tension relieving technique.  Wounds dressed sterilely and the patient was awakened taken recovery room in good condition.  Tourniquet time 15 minutes.  The patient received Ancef antibiotic intravenously prior to procedure    Description of the Findings of the Procedure:  Subcutaneous abscess cavity popliteal space  left knee    Significant Surgical Tasks Conducted by the Assistant(s), if Applicable:  None    Complications: No    Estimated Blood Loss (EBL): * No values recorded between 2/21/2025  8:52 AM and 2/21/2025  9:21 AM *           Implants: * No implants in log *    Specimens:   Specimen (24h ago, onward)       Start     Ordered    02/21/25 0921  Surgical Pathology  Once        Question Answer Comment   Number of specimens 1    Source(s): Knee, Left    Clinical History: left knee abscess        02/21/25 0921    02/21/25 0859  Surgical Pathology  RELEASE UPON ORDERING         02/21/25 0859                            Condition: Good    Disposition: PACU - hemodynamically stable.    Attestation: I was present and scrubbed for the entire procedure.

## 2025-02-21 NOTE — TRANSFER OF CARE
"Anesthesia Transfer of Care Note    Patient: Hector Gonzalez    Procedure(s) Performed: Procedure(s) (LRB):  INCISION AND DRAINAGE, KNEE (Left)    Patient location: PACU    Anesthesia Type: general    Transport from OR: Transported from OR on 6-10 L/min O2 by face mask with adequate spontaneous ventilation    Post pain: adequate analgesia    Post assessment: no apparent anesthetic complications    Post vital signs: stable    Level of consciousness: awake and alert    Nausea/Vomiting: no nausea/vomiting    Complications: none    Transfer of care protocol was followed      Last vitals: Visit Vitals  /63   Pulse 88   Temp 36.6 °C (97.9 °F)   Resp 16   Ht 5' 9" (1.753 m)   Wt 63.5 kg (140 lb)   SpO2 99%   BMI 20.67 kg/m²     "

## 2025-02-21 NOTE — OP NOTE
[facility] - Orthopedic Periop Services  Surgery Department  Operative Note    SUMMARY     Date of Procedure: 2/21/2025     Procedure: Procedure(s) (LRB):  INCISION AND DRAINAGE, KNEE (Left)     Surgeons and Role:     * Roberto Escobar MD - Primary    Assisting Surgeon: None    Pre-Operative Diagnosis: Cutaneous abscess, unspecified site [L02.91]    Post-Operative Diagnosis: Post-Op Diagnosis Codes:     * Cutaneous abscess, unspecified site [L02.91]    Anesthesia:  General    Operative Findings (including complications, if any):  Abscess popliteal space left knee    Description of Technical Procedures:  Incision and drainage abscess popliteal space left knee    Significant Surgical Tasks Conducted by the Assistant(s), if Applicable:  None    Estimated Blood Loss (EBL): * No values recorded between 2/21/2025  8:52 AM and 2/21/2025  9:21 AM * 5 cc           Implants: * No implants in log *    Specimens:   Specimen (24h ago, onward)       Start     Ordered    02/21/25 0921  Surgical Pathology  Once        Question Answer Comment   Number of specimens 1    Source(s): Knee, Left    Clinical History: left knee abscess        02/21/25 0921    02/21/25 0859  Surgical Pathology  RELEASE UPON ORDERING         02/21/25 0859                            Condition: Good    Disposition: PACU - hemodynamically stable.    Attestation: Op Note Attestation: I was physically present and scrubbed for the entire procedure.

## 2025-02-21 NOTE — ANESTHESIA PROCEDURE NOTES
Intubation    Date/Time: 2/21/2025 8:29 AM    Performed by: Maykel Sparrow CRNA  Authorized by: Guillermo Gibbs MD    Intubation:     Induction:  Intravenous    Intubated:  Postinduction    Mask Ventilation:  Easy mask    Attempts:  1    Attempted By:  CRNA    Method of Intubation:  Direct    Blade:  Vivas 2    Laryngeal View Grade: Grade IIA - cords partially seen      Difficult Airway Encountered?: No      Complications:  None    Airway Device:  Oral endotracheal tube    Airway Device Size:  7.5    Style/Cuff Inflation:  Cuffed    Inflation Amount (mL):  10    Tube secured:  22    Secured at:  The lips    Placement Verified By:  Capnometry    Complicating Factors:  None    Findings Post-Intubation:  BS equal bilateral

## 2025-02-21 NOTE — DISCHARGE SUMMARY
"[facility] - Orthopedic Periop Services  Discharge Note  Short Stay    Procedure(s) (LRB):  INCISION AND DRAINAGE, KNEE (Left)      OUTCOME: Patient tolerated treatment/procedure well without complication and is now ready for discharge.    DISPOSITION: Home or Self Care    FINAL DIAGNOSIS:  Abscess of knee, left    FOLLOWUP: In clinic    DISCHARGE INSTRUCTIONS:    Discharge Procedure Orders   CRUTCHES FOR HOME USE     Order Specific Question Answer Comments   Type: Axillary    Height: 5' 9" (1.753 m)    Weight: 63.5 kg (140 lb)    Length of need (1-99 months): 2      Diet general     Keep surgical extremity elevated     Ice to affected area   Order Comments: using barrier between ice and skin (specify duration&frequency)     Remove dressing in 72 hours   Order Comments: Keep dressing in place for 72 hours     Change dressing (specify)   Order Comments: Dressing change: one time per day beginning 72 hours post op.     Call MD for:  temperature >100.4     Call MD for:  persistent nausea and vomiting     Call MD for:  severe uncontrolled pain     Call MD for:  difficulty breathing, headache or visual disturbances     Call MD for:  redness, tenderness, or signs of infection (pain, swelling, redness, odor or green/yellow discharge around incision site)     Call MD for:  hives     Call MD for:  persistent dizziness or light-headedness     Call MD for:  extreme fatigue     Activity as tolerated     Shower on day dressing removed (No bath)     Weight bearing as tolerated        TIME SPENT ON DISCHARGE:  15 minutes  "

## 2025-02-21 NOTE — OR NURSING
0922 Rec'd pt to PACU asleep with no distress noted, respirations even and unlabored. Temp 94.5 oral, all other VSS. Getachew hugger and warm blankets applied. Left knee dressing C/D/I. No needs. Will continue to monitor.     1011 Out of PACU. VSS. No signs of bleeding/distress noted. Recheck temp  oral.     1015 Pt to ASC 21 awake and alert with no distress noted, respirations even and unlabored. Family at bedside. Bedside report given to ABDELRAHMAN Brandon RN. Left knee dressing C/D/I. Denies pain/needs. /52, P 54, R 16, O2 98% RA.

## 2025-02-23 LAB — MICROORGANISM SPEC CULT: NORMAL

## 2025-02-24 LAB
ESTROGEN SERPL-MCNC: NORMAL PG/ML
INSULIN SERPL-ACNC: NORMAL U[IU]/ML
LAB AP CLINICAL INFORMATION: NORMAL
LAB AP GROSS DESCRIPTION: NORMAL
LAB AP LABORATORY NOTES: NORMAL
T3RU NFR SERPL: NORMAL %

## 2025-02-25 NOTE — ANESTHESIA POSTPROCEDURE EVALUATION
Anesthesia Post Evaluation    Patient: Hector Gonzalez    Procedure(s) Performed: Procedure(s) (LRB):  INCISION AND DRAINAGE, KNEE (Left)    Final Anesthesia Type: general      Patient location during evaluation: PACU  Post-procedure vital signs: reviewed and stable  Pain management: adequate  Airway patency: patent    PONV status at discharge: No PONV  Anesthetic complications: no      Cardiovascular status: hemodynamically stable  Respiratory status: unassisted  Hydration status: euvolemic  Follow-up not needed.              Vitals Value Taken Time   /41 02/21/25 10:50   Temp 36.6 °C (97.9 °F) 02/21/25 09:27   Pulse 55 02/21/25 10:57   Resp 16 02/21/25 11:00   SpO2 97 % 02/21/25 10:57   Vitals shown include unfiled device data.      Event Time   Out of Recovery 10:11:00         Pain/Candis Score: No data recorded

## 2025-02-27 LAB — BACTERIA SPEC ANAEROBE CULT: NORMAL

## 2025-03-03 ENCOUNTER — OFFICE VISIT (OUTPATIENT)
Dept: ORTHOPEDICS | Facility: CLINIC | Age: 20
End: 2025-03-03
Payer: COMMERCIAL

## 2025-03-03 DIAGNOSIS — Z09 POSTOP CHECK: Primary | ICD-10-CM

## 2025-03-03 PROCEDURE — 99999 PR PBB SHADOW E&M-EST. PATIENT-LVL II: CPT | Mod: PBBFAC,,,

## 2025-03-03 PROCEDURE — 99024 POSTOP FOLLOW-UP VISIT: CPT | Mod: ,,,

## 2025-03-03 PROCEDURE — 99212 OFFICE O/P EST SF 10 MIN: CPT | Mod: PBBFAC

## 2025-03-03 NOTE — LETTER
March 3, 2025      [facility] - Orthopedics  [address] [facility] [address]  Phone: [phone]  Fax: [phone]       Patient: Hector Gonzalez   YOB: 2005  Date of Visit: 03/03/2025    To Whom It May Concern:    Madisyn Gonzalez  was at [facility] on 03/03/2025. The patient may return to work/school on 3/4/25 with no climbing for one week. If you have any questions or concerns, or if I can be of further assistance, please do not hesitate to contact me.    Sincerely,    Mary Damian III, M.D.

## 2025-03-03 NOTE — PROGRESS NOTES
Incision And Drainage, Knee - Left 2/21/2025    Hector Gonzalez is a 19 y.o. male seen today for post-op visit.  Patient is 10 days status post I and D of left knee subcutaneous abscess by Dr. Escobar.  Wound cultures negative.  Patient has finished antibiotics.  No complaints today.      PAST MEDICAL HISTORY: No past medical history on file.     PAST SURGICAL HISTORY:   Past Surgical History:   Procedure Laterality Date    INCISION AND DRAINAGE OF KNEE Left 2/21/2025    Procedure: INCISION AND DRAINAGE, KNEE;  Surgeon: Roberto Escobar MD;  Location: [facility];  Service: Orthopedics;  Laterality: Left;  I&D left posterior knee abscess        MEDICATIONS: Current Medications[1]       PHYSICAL EXAM:      GENERAL: Well-developed, well-nourished male . The patient is alert, oriented and cooperative.    EXTREMITIES:  Left knee minimal swelling and erythema around incision site, good range of motion of knee on exam today, neurovascularly intact    WOUND: Incisions are clean,dry,intact without signs of infection and healing well      RADIOGRAPHIC FINDINGS:   X-Ray Knee 1 or 2 View Left  Result Date: 2/10/2025  EXAMINATION: XR KNEE, 2 VIEWS, LEFT CLINICAL HISTORY: Left knee pain. TECHNIQUE: AP, lateral views of left knee obtained. COMPARISON: No past imaging of left knee at this time.     1.  No significant osseous abnormality with no fracture, dislocation, osseous destruction or joint narrowing. 2.  No significant soft tissue swelling identified. 3.  Significant clothing artifact. Electronically signed by: Hector Collins Date:    02/10/2025 Time:    08:28       Patient Active Problem List    Diagnosis Date Noted    Abscess of knee, left 02/17/2025     IMPRESSION AND PLAN: Patient is status-post Incision And Drainage, Knee - Left doing well. All incisional sutures/staples removed today and replaced with Steri-Strips, discussed that these can get wet in the shower in 2-3 days, let them fall off on  their own.  Finished antibiotics.  Activities and weight-bearing as tolerated.  Okay to return to work, recommend no climbing for 1 week.  Follow up Dr. Escobar 2 weeks.    No follow-ups on file.       Mirlande Garcia PA-C      (Subject to voice recognition error, transcription service not allowed)         [1]   Current Outpatient Medications:     ibuprofen (ADVIL,MOTRIN) 800 MG tablet, Take 1 tablet (800 mg total) by mouth every 8 (eight) hours as needed for Pain., Disp: 30 tablet, Rfl: 0    sulfamethoxazole-trimethoprim 800-160mg (BACTRIM DS) 800-160 mg Tab, Take 1 tablet by mouth 2 (two) times daily., Disp: 20 tablet, Rfl: 0

## (undated) DEVICE — PAD CAST SPECIALIST STRL 3

## (undated) DEVICE — SUT 4-0 ETHILON 18 PS-2

## (undated) DEVICE — GOWN POLY REINF BRTH SLV XL

## (undated) DEVICE — GLOVE SENSICARE PI GRN 8

## (undated) DEVICE — APPLICATOR CHLORAPREP ORN 26ML

## (undated) DEVICE — Device

## (undated) DEVICE — STOCKINETTE TUBULAR 2PL 6 X 4

## (undated) DEVICE — SUT VICRYL 2-0 36 CT-1

## (undated) DEVICE — GLOVE SENSICARE PI SURG 7.5